# Patient Record
Sex: MALE
[De-identification: names, ages, dates, MRNs, and addresses within clinical notes are randomized per-mention and may not be internally consistent; named-entity substitution may affect disease eponyms.]

---

## 2018-01-07 ENCOUNTER — HOSPITAL ENCOUNTER (EMERGENCY)
Dept: HOSPITAL 90 - EDH | Age: 48
Discharge: LEFT BEFORE DISCHARGE | End: 2018-01-07
Payer: MEDICAID

## 2018-01-07 DIAGNOSIS — Z53.21: Primary | ICD-10-CM

## 2018-01-07 DIAGNOSIS — Z88.0: ICD-10-CM

## 2018-03-30 ENCOUNTER — HOSPITAL ENCOUNTER (EMERGENCY)
Dept: HOSPITAL 90 - EDH | Age: 48
Discharge: HOME | End: 2018-03-30
Payer: MEDICAID
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
## 2018-03-30 DIAGNOSIS — Z88.0: ICD-10-CM

## 2018-03-30 DIAGNOSIS — F32.9: ICD-10-CM

## 2018-03-30 DIAGNOSIS — R11.0: Primary | ICD-10-CM

## 2018-03-30 DIAGNOSIS — T50.995A: ICD-10-CM

## 2018-03-30 LAB
ALBUMIN SERPL-MCNC: 3.8 G/DL (ref 3.5–5)
ALT SERPL-CCNC: 29 U/L (ref 12–78)
AST SERPL-CCNC: 21 U/L (ref 10–37)
BASOPHILS NFR BLD AUTO: 0.4 % (ref 0–5)
BILIRUB SERPL-MCNC: 1.2 MG/DL (ref 0.2–1)
BUN SERPL-MCNC: 17 MG/DL (ref 7–18)
CHLORIDE SERPL-SCNC: 106 MMOL/L (ref 101–111)
CO2 SERPL-SCNC: 29 MMOL/L (ref 21–32)
CREAT SERPL-MCNC: 1.1 MG/DL (ref 0.5–1.5)
EOSINOPHIL NFR BLD AUTO: 0.4 % (ref 0–8)
ERYTHROCYTE [DISTWIDTH] IN BLOOD BY AUTOMATED COUNT: 13 % (ref 11–15.5)
GFR SERPL CREATININE-BSD FRML MDRD: 76 ML/MIN (ref 60–?)
GLUCOSE SERPL-MCNC: 169 MG/DL (ref 70–105)
HCT VFR BLD AUTO: 47 % (ref 42–54)
LYMPHOCYTES NFR SPEC AUTO: 17.2 % (ref 21–51)
MCH RBC QN AUTO: 30.1 PG (ref 27–33)
MCHC RBC AUTO-ENTMCNC: 35 G/DL (ref 32–36)
MCV RBC AUTO: 86.1 FL (ref 79–99)
MONOCYTES NFR BLD AUTO: 4.3 % (ref 3–13)
NEUTROPHILS NFR BLD AUTO: 77.7 % (ref 40–77)
NRBC BLD MANUAL-RTO: 0 % (ref 0–0.19)
PLATELET # BLD AUTO: 217 K/UL (ref 130–400)
POTASSIUM SERPL-SCNC: 3.5 MMOL/L (ref 3.5–5.1)
PROT SERPL-MCNC: 7.8 G/DL (ref 6–8.3)
RBC # BLD AUTO: 5.46 MIL/UL (ref 4.5–6.2)
SODIUM SERPL-SCNC: 145 MMOL/L (ref 136–145)
WBC # BLD AUTO: 9.1 K/UL (ref 4.8–10.8)

## 2018-03-30 PROCEDURE — 85025 COMPLETE CBC W/AUTO DIFF WBC: CPT

## 2018-03-30 PROCEDURE — 80053 COMPREHEN METABOLIC PANEL: CPT

## 2018-03-30 PROCEDURE — 36415 COLL VENOUS BLD VENIPUNCTURE: CPT

## 2019-03-20 ENCOUNTER — HOSPITAL ENCOUNTER (EMERGENCY)
Dept: HOSPITAL 90 - EDH | Age: 49
LOS: 1 days | Discharge: HOME | End: 2019-03-21
Payer: MEDICAID

## 2019-03-20 DIAGNOSIS — F32.9: ICD-10-CM

## 2019-03-20 DIAGNOSIS — F41.9: Primary | ICD-10-CM

## 2025-01-19 ENCOUNTER — HOSPITAL ENCOUNTER (EMERGENCY)
Dept: HOSPITAL 90 - EDH | Age: 55
Discharge: HOME | End: 2025-01-19
Payer: MEDICAID

## 2025-01-19 VITALS
HEART RATE: 70 BPM | TEMPERATURE: 98.5 F | RESPIRATION RATE: 17 BRPM | DIASTOLIC BLOOD PRESSURE: 93 MMHG | SYSTOLIC BLOOD PRESSURE: 167 MMHG | OXYGEN SATURATION: 97 %

## 2025-01-19 VITALS — WEIGHT: 285.06 LBS | HEIGHT: 71 IN | BODY MASS INDEX: 39.91 KG/M2

## 2025-01-19 DIAGNOSIS — K04.7: Primary | ICD-10-CM

## 2025-01-19 DIAGNOSIS — K02.9: ICD-10-CM

## 2025-01-19 PROCEDURE — 96372 THER/PROPH/DIAG INJ SC/IM: CPT

## 2025-01-19 PROCEDURE — 99284 EMERGENCY DEPT VISIT MOD MDM: CPT

## 2025-01-19 NOTE — ERN
General


Chief Complaint:  Tooth Ache/Pain


Stated Complaint:  TOOTHACHE


Time Seen by MD:  00:36


Time Seen by Midlevel:  00:36


Source:  patient





History of Present Illness


Initial Comments


Patient is a 54-year-old male presenting to the emergency department with right 

lower dental pain has been ongoing for the last couple of days but progressively

got worse tonight.  Denies any fever, chills, or any other symptoms at this 

time.


Allergies:  


Coded Allergies:  


     No Known Allergies (Unverified  Allergy, Unknown, 1/19/25)


Home Meds


Active Scripts


Clindamycin HCl (Clindamycin HCl) 300 Mg Capsule, 1 CAP PO TID for 7 Days, #21 

CAP 0 Refills


   Prov:ZACH RIVERO         1/19/25


Ketorolac Tromethamine (Ketorolac Tromethamine) 10 Mg Tablet, 1 TAB PO TID for 

pain for 5 Days, #15 TAB 0 Refills


   Prov:ZACH RIVERO         1/19/25





Past Medical History


Past Medical History:  No Pertinent History


Past Surgical History:  Other


Surgical History Other:  PENIS





ROS Dictation


CONSTITUTIONAL:  Negative except for HPI


HEAD/FACE:  Negative except for HPI


EENT:  Negative except for HPI


RESPIRATORY: Negative except for HPI


GASTROINTESTINAL/ABDOMINAL:   Negative except for HPI


GENITOURINARY: Negative except for HPI


MUSCULOSKELETAL: Negative except for HPI


INTEGUMENTARY:  Negative except for HPI


NEUROLOGICAL/PSYCH: Negative except for HPI


HEMATOLOGIC/LYMPHATIC:  Negative except for HPI





All Systems Negative, Except as noted above.





13 point review of systems assessed and all negative except for above.





Physical Exam


Physical Exam Dictation


PHYSICAL EXAM: 


GENERAL: alert,, awake oriented x 3


HEENT:  Dental caries to the right lower molar with surrounding gum inflammation

concerning for a dental abscess


NECK: Supple, no JVD, trachea midline 


LUNGS: Clear breath sounds bilaterally. No wheezes


HEART: Regular rate and rhythm. Normal S1 and S2, without murmurs 


ABD: Abdomen soft, nontender. Bowel sounds present


EXT: No clubbing or cyanosis,


NEURO: Alert and oriented to person, follows commands





MDM


MDM: 


Differential diagnosis:  Dental caries, dental abscess, gum abscess





There are no social concerns with this patient.





Prescription drug management


Prescriptions will include:  Clindamycin and Toradol





Medical management and examination interpretation discussions were had by me 

with other qualified healthcare professionals as indicated for the patient's 

care.


ED Course





Orders








Procedure Category Date Status





  Time 


 


Ketorolac PHA 1/19/25 Complete





Tromethamine 30mg/Ml  01:00 


 


Dexamethasone 4mg/Ml PHA 1/19/25 Complete





1ml Vial (Dexametha  01:00 


 


Ceftriaxone 1g Vial PHA 1/19/25 Complete





 (Rocephine 1g Inj)  01:00 








Current Medications








 Medications


  (Trade)  Dose


 Ordered  Sig/Papi


 Route


 PRN Reason  Start Time


 Stop Time Status Last Admin


Dose Admin


 


 Ceftriaxone Sodium


  (ROCEphine 1G


 INJ)  1 gm  ONCE  ONCE


 IM


   1/19/25 01:00


 1/19/25 01:01 DC  





 


 Dexamethasone


 Sodium Phosphate


  (dexaMETHasone


 4MG/ML 1ML VIAL)  6 mg  ONCE  ONCE


 IM


   1/19/25 01:00


 1/19/25 01:01 DC  





 


 Ketorolac


 Tromethamine


  (toRADol)  30 mg  ONCE  ONCE


 IM


   1/19/25 01:00


 1/19/25 01:01 DC  











Vital Signs








  Date Time  Temp Pulse Resp B/P (MAP) Pulse Ox O2 Delivery O2 Flow Rate FiO2


 


1/19/25 00:33 97.3 74 16 165/98 98 Room Air  











DX & DISP


Disposition:  Discharge


Departure


Impression:  


   Primary Impression:  Dental abscess


   Additional Impression:  Dental caries


Condition:  Stable


Scripts


Clindamycin HCl (Clindamycin HCl) 300 Mg Capsule


1 CAP PO TID for 7 Days, #21 CAP 0 Refills


   Prov: ZACH RIVERO         1/19/25 


Ketorolac Tromethamine (Ketorolac Tromethamine) 10 Mg Tablet


1 TAB PO TID for pain for 5 Days, #15 TAB 0 Refills


   Prov: ZACH RIVERO         1/19/25





Additional Instructions:  


Based on your physical examination it appears there is an infection to her right

lower tooth.  


You were given antibiotics and pain medication in the emergency department.  


I have given you a prescription for pain medication and oral antibiotics for 

outpatient management.  


Please make sure you follow up with a dentist outpatient for further evaluation.


Referrals:  


PJ ALFORD MD (PCP)


I have reviewed the case, and I agree with, Diagnosis and Plan


I performed the substantive portion of the visit.  I have reviewed and 

personally made and approve the management plan that is documented in the note 

by myself or the LUIS. I acknowledge for responsibility for the patient's 

management plan.











ZACH RIVERO                Jan 19, 2025 01:00